# Patient Record
(demographics unavailable — no encounter records)

---

## 2024-11-22 NOTE — PHYSICAL EXAM
[Alert] : alert [Oriented x 3] : ~L oriented x 3 [Well Nourished] : well nourished [Conjunctiva Non-injected] : conjunctiva non-injected [No Visual Lymphadenopathy] : no visual  lymphadenopathy [No Clubbing] : no clubbing [No Edema] : no edema [No Bromhidrosis] : no bromhidrosis [No Chromhidrosis] : no chromhidrosis [Full Body Skin Exam Performed] : performed [FreeTextEntry3] :  AAOx3, NAD, well-appearing / pleasant Total body skin exam performed within normal limits with the exception of:   well healed incision on back x 3 four uniform light brown macules glans penis Fairly uniform and regular brown macules and papules on the trunk and extremities calf with petechial annular patches with hemosiderin deposition in center tan skin no cervical, axillary, or inguinal LAD

## 2024-11-22 NOTE — ASSESSMENT
[FreeTextEntry1] : #Penile melanosis - Reassured about benign appearance today - RTC for any significant change  #Capillaritis  - Disc lightening options including amlactin, HQ, sal acid or glycolic peels - Pt defers for now  #Melanoma of back 0.5 mm back s/p wide excision to fascia 4/23 -[facility]  # Multiple melanocytic nevi, all benign appearing on today's exam -Sun protection was discussed. The proper use of broad spectrum UVB/UVA sunscreen with SPF 30 or greater was reviewed and the need for re-application after swimming or sweating or 2-3 hours was emphasized. We discussed judicious use of clothing and avoidance of peak periods of sun exposure. I made the patient aware of need for year-round protection. ABCDEs of melanoma reviewed. -Self-skin check also reviewed -Counseled pt to monitor for changes   # Screening for skin cancer -ABCDEs of melanoma, sun safety, and self-skin check reviewed -Counseled pt to notify us of any changing or concerning lesions -RTC 3-4 months, sooner prn

## 2024-11-22 NOTE — HISTORY OF PRESENT ILLNESS
[FreeTextEntry1] : FU melanoma  [de-identified] : RP prev saw Dr Fernández hx melanoma back 4/2023 more recently had nevus w unusual features excised on back no new or changing spots

## 2025-01-13 NOTE — HEALTH RISK ASSESSMENT
[0] : 2) Feeling down, depressed, or hopeless: Not at all (0) [PHQ-2 Negative - No further assessment needed] : PHQ-2 Negative - No further assessment needed [LLO4Eduru] : 0 [Never] : Never

## 2025-01-13 NOTE — HISTORY OF PRESENT ILLNESS
[FreeTextEntry1] : follow up  [de-identified] : 61 yo m presents for follow up  still working with therapy, has stopped the meds for now, still has them at home in case he needs them   here to follow up cholesterol meds

## 2025-01-13 NOTE — PLAN
[FreeTextEntry1] : follow up labs, labs drawn in office  encouraged healthy lifestyles  cont meds  mood stable, still with therapy   reviewed previous chol, stable, follow up labs

## 2025-02-21 NOTE — ASSESSMENT
[FreeTextEntry1] : #Penile melanosis - Reassured about benign appearance today - RTC for any significant change  #Capillaritis, some areas now with #PIH - Disc lightening options including amlactin, HQ, sal acid or glycolic peels - Pt defers for now  #Melanoma of back 0.5 mm back s/p wide excision to fascia 4/23 -[facility]  # Multiple melanocytic nevi, all benign appearing on today's exam -Sun protection was discussed. The proper use of broad spectrum UVB/UVA sunscreen with SPF 30 or greater was reviewed and the need for re-application after swimming or sweating or 2-3 hours was emphasized. We discussed judicious use of clothing and avoidance of peak periods of sun exposure. I made the patient aware of need for year-round protection. ABCDEs of melanoma reviewed. -Self-skin check also reviewed -Counseled pt to monitor for changes   # Screening for skin cancer -ABCDEs of melanoma, sun safety, and self-skin check reviewed -Counseled pt to notify us of any changing or concerning lesions -RTC 4 months, sooner prn

## 2025-02-21 NOTE — PHYSICAL EXAM
[Alert] : alert [Oriented x 3] : ~L oriented x 3 [Well Nourished] : well nourished [Conjunctiva Non-injected] : conjunctiva non-injected [No Visual Lymphadenopathy] : no visual  lymphadenopathy [No Clubbing] : no clubbing [No Edema] : no edema [No Bromhidrosis] : no bromhidrosis [No Chromhidrosis] : no chromhidrosis [Full Body Skin Exam Performed] : performed [FreeTextEntry3] :  AAOx3, NAD, well-appearing / pleasant Total body skin exam performed within normal limits with the exception of:   well healed incision on back x 3 four uniform light brown macules glans penis Fairly uniform and regular brown macules and papules on the trunk and extremities right lower leg and b/l medial malleolus with petechial annular patches with hemosiderin deposition in center, hyperpigmented patch posterior calf tan skin no axillary or inguinal LAD

## 2025-02-21 NOTE — HISTORY OF PRESENT ILLNESS
[FreeTextEntry1] : RP - Check for moles [de-identified] : Joey Garcia 61 y/o M presents for eval of moles hx melanoma back 4/2023 more recently had nevus w unusual features excised on back no new or changing spots thinks some of the capillaritis spots on lower legs may be fading

## 2025-03-05 NOTE — PHYSICAL EXAM
[No Acute Distress] : no acute distress [Well Nourished] : well nourished [Well Developed] : well developed [Well-Appearing] : well-appearing [Normal Sclera/Conjunctiva] : normal sclera/conjunctiva [Normal Outer Ear/Nose] : the outer ears and nose were normal in appearance [Normal Oropharynx] : the oropharynx was normal [Normal TMs] : both tympanic membranes were normal [No JVD] : no jugular venous distention [No Lymphadenopathy] : no lymphadenopathy [Supple] : supple [No Respiratory Distress] : no respiratory distress  [No Accessory Muscle Use] : no accessory muscle use [Clear to Auscultation] : lungs were clear to auscultation bilaterally [Normal Rate] : normal rate  [Regular Rhythm] : with a regular rhythm [Soft] : abdomen soft [Normal Gait] : normal gait [Speech Grossly Normal] : speech grossly normal [Normal Affect] : the affect was normal [Normal Mood] : the mood was normal [Normal Insight/Judgement] : insight and judgment were intact

## 2025-03-05 NOTE — HISTORY OF PRESENT ILLNESS
[FreeTextEntry1] : follow up [de-identified] : Pt here for follow up s/p RSV. Had fever, cough, congestion on 2/16 and went to urgent care- was diagnosed with RSV- was told to take Theraflu, hydrate, and was given albuterol inhaler which he has been using PRN. He feels much better now but still has lingering cough with tightness and chest tickle. He is going to be traveling to [facility] tomorrow.  Pt with high cholesterol levels, taking atorvastatin 20mg daily, tolerating it well, exercises and eats mostly healthy.  Pt with male pattern hair loss taking finasteride 1mg daily tolerating it well, reports improvement in hair growth.  Had flu shot this past fall.  Was taking bupropion but stopped taking it and has been going to therapy and this is helping. mood stable. Feels well and has no other concerns today.

## 2025-03-05 NOTE — HEALTH RISK ASSESSMENT
[0] : 2) Feeling down, depressed, or hopeless: Not at all (0) [PHQ-2 Negative - No further assessment needed] : PHQ-2 Negative - No further assessment needed [[id number]] : 0

## 2025-05-12 NOTE — HISTORY OF PRESENT ILLNESS
[FreeTextEntry1] : follow up  [de-identified] : 61 yo m presents for follow up  still working with therapy, has stopped the meds for now, still has them at home in case he needs them   here to follow up cholesterol meds

## 2025-06-23 NOTE — HISTORY OF PRESENT ILLNESS
[FreeTextEntry1] : RP - mole check, FU melanoma [de-identified] : Joey Garcia 60 y/o M presents for mole check No new or changing spots capillaritis fading moving in with boyfriend, looking for a house in [facility] melanoma back 4/2023 more recently had nevus w unusual features excised on back

## 2025-06-23 NOTE — ASSESSMENT
[FreeTextEntry1] : #SK R postauricular -RTC for change, appears benign -Tx declined  #Penile melanosis - Reassured about benign appearance  - RTC for any significant change  #Capillaritis, chronic and improving  #Melanoma of back 0.5 mm back s/p wide excision to fascia 4/23 -[facility]  # Multiple melanocytic nevi, all benign appearing on today's exam -Sun protection was discussed. The proper use of broad spectrum UVB/UVA sunscreen with SPF 30 or greater was reviewed and the need for re-application after swimming or sweating or 2-3 hours was emphasized. We discussed judicious use of clothing and avoidance of peak periods of sun exposure. I made the patient aware of need for year-round protection. ABCDEs of melanoma reviewed. -Hx nevus with unusual features also s/p excision  -Self-skin check also reviewed -Counseled pt to monitor for changes   # Screening for skin cancer -ABCDEs of melanoma, sun safety, and self-skin check reviewed -Counseled pt to notify us of any changing or concerning lesions -RTC 6 months, sooner prn

## 2025-06-23 NOTE — PHYSICAL EXAM
[Alert] : alert [Oriented x 3] : ~L oriented x 3 [Well Nourished] : well nourished [Conjunctiva Non-injected] : conjunctiva non-injected [No Visual Lymphadenopathy] : no visual  lymphadenopathy [No Clubbing] : no clubbing [No Edema] : no edema [No Bromhidrosis] : no bromhidrosis [No Chromhidrosis] : no chromhidrosis [Full Body Skin Exam Performed] : performed [FreeTextEntry3] :  AAOx3, NAD, well-appearing / pleasant Total body skin exam performed within normal limits with the exception of:   well healed incision on back x 3 no LAD head, neck, axilla, groin stuck on rough papule right postauricular few scattered uniform and regular brown macules and papules on the trunk and extremities b/l medial malleolus with petechial annular patches with hemosiderin deposition in center, fading hyperpigmented patch posterior calf tan skin

## 2025-07-03 NOTE — REVIEW OF SYSTEMS
[Patient Intake Form Reviewed] : Patient intake form was reviewed [Muscle Pain] : muscle pain [Muscle Weakness] : muscle weakness [Fever] : no fever [Chills] : no chills [Joint Pain] : no joint pain

## 2025-07-03 NOTE — REASON FOR VISIT
[Initial Evaluation] : an initial evaluation [FreeTextEntry1] : pain and weakness numbness  in L arm referred by Dr Carter

## 2025-07-03 NOTE — ASSESSMENT
[FreeTextEntry1] :   PLAN AND RECOMMENDATIONS :   We discussed differential diagnosis and clinical impression  clinically he has C8 radiculopathy rule out plexopathy -no sign of ulnar nerve lesion  needs MR c spine he is very weak 3/5 left hand and LHD also needs EMG  start medrol dose and gabapentin   Recommend .symptomatic care and support  medications as above double dose   imaging ordered   referral to PT   hydrotherapy /heat / cold for pain  continue  spinal precautions including care with bending, lifting, twisting and  carrying.  relative rest and avoidance of painful activity where possible  increasing activity as discussed  return for follow up.  for EMG  Information given to patient about EMG and Nerve Conduction Study Examination including  planning, differential diagnosis to rule in /rule out ,duration of the test ,precautions (if patient on blood thinner.has bleeding disorder or  pace maker device etc -still possible to undergo with care), side effects(benign-limited to short term bruising and discomfort/pain)   The protocol of temp checks upon arrival ,disinfection procedure of waiting room and the lab explained- reassured.  All questions answered.  Patient instructed to book appointment upon conclusion of appointment  Information sheet ' Answers to your Questions on EMG " forwarded to patient to read prior to testing, with further information about training,background and the procedure itself .  The patient had the opportunity to ask questions and all were answered to their satisfaction. We will coordinate treatment with the other members of the treatment team. The patient verbalized understanding of the management/plan rationale and agreed with my recommendations. To help the patient understand their condition a plastic 3D model  of the spine and discs was used to better explain how pain is referred down a nerve  side effects of meds also explained .

## 2025-07-03 NOTE — PHYSICAL EXAM
[FreeTextEntry1] :   PHYSICAL EXAM : OBJECTIVE   GENERAL : Awake ,alert and oriented to time place and person HEAD : normocephalic and atraumatic NECK : supple ,no tracheal deviation ,no thyroid enlargement noted with swallowing EYES : sclera and conjunctiva normal no redness,intact extraocular movements ENT  : ears and nose normal in appearance -hearing adequate PULMONARY: effort normal. No respiratory distress. breathing regular. No wheezes LYMPH : No swelling in limbs, capillary return within normal range CVS : warm extremities,no palpitations,not short of breath, no visible jugular venous distention PSYCH : mood and affect normal ,good eye contact ,normal attention ABDOMEN : no visible distension , NEUROLOGICAL:cranial nerves intact,muscle tone normal,gait and balance safe except where noted below SKIN : warm and dry No rash detected over specific body areas examined MUSCULOSKELETAL: normal muscle bulk, no focal bony tenderness /posture normal except where specified below   c sine ROM full pain  free weak PAD and DAB  numb 4 5 th digits not foreams med lat  no wasting  neg tinels left elbow

## 2025-07-03 NOTE — HISTORY OF PRESENT ILLNESS
[FreeTextEntry1] :   Mr. GERHARD ANGULO is a very pleasant LHD 61-year male who comes in for evaluation of soreness in L arm that has been ongoing for 6+ months when he had fallen when a dog he was minding  got under his feet and wrenched his left arm  since then the pain has persisted  he saw a PT he knew who told him he had " nerve damage " . The pain is located primarily elbow and radiates to medial  fingertips intermittent in nature and described as dull, achy, numbness, stiff, tingly. The pain is rated as 3/10 during today's visit, and ranges from 3~7/10. The patient's symptoms are aggravated by walking, lifting heavy objects, swinging  his arms and alleviated by resting arm on his chest,. The patient denies any night pain,  but if he moves certain way at night the way ithe pain is felt  continuous numbness/tingling meidal digits ,  hand weakness he is dropping things , no bowel/bladder dysfunction. The patient has no other complaints at this time.   The patient has previously tried 3 sessions of PT did help.  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

## 2025-07-16 NOTE — PROCEDURE
[de-identified] : EMG /NERVE CONDUCTION STUDY performed today without complication  Tabulated data, wave forms , conclusions and recommendations are attached and in the procedure report.  Please refer to the scanned study attached to this encounter  Full history and focused clinical exam performed prior to the examination and documented in report